# Patient Record
Sex: MALE | Race: WHITE
[De-identification: names, ages, dates, MRNs, and addresses within clinical notes are randomized per-mention and may not be internally consistent; named-entity substitution may affect disease eponyms.]

---

## 2021-04-25 ENCOUNTER — HOSPITAL ENCOUNTER (EMERGENCY)
Dept: HOSPITAL 8 - ED | Age: 31
Discharge: HOME | End: 2021-04-25
Payer: MEDICAID

## 2021-04-25 VITALS — BODY MASS INDEX: 37.92 KG/M2 | HEIGHT: 72 IN | WEIGHT: 279.99 LBS

## 2021-04-25 VITALS — SYSTOLIC BLOOD PRESSURE: 127 MMHG | DIASTOLIC BLOOD PRESSURE: 70 MMHG

## 2021-04-25 DIAGNOSIS — Z91.14: ICD-10-CM

## 2021-04-25 DIAGNOSIS — F43.12: Primary | ICD-10-CM

## 2021-04-25 DIAGNOSIS — Z76.0: ICD-10-CM

## 2021-04-25 DIAGNOSIS — F17.210: ICD-10-CM

## 2021-04-25 PROCEDURE — 99281 EMR DPT VST MAYX REQ PHY/QHP: CPT

## 2021-04-25 PROCEDURE — 99406 BEHAV CHNG SMOKING 3-10 MIN: CPT

## 2021-04-25 NOTE — NUR
assumed care of pt.  pt here stating that he thinks that he needs his meds 
adjusted.  pt currently has no MD and states that the last time that he got his 
meds [place].  



pt is restless and pacing around the room.  denies injury.  denies 
hallucinations/delusions.  A&O x4.  no injuries noted



no family at bedside

## 2021-04-25 NOTE — NUR
TASK RN: PT BIB SELF VIA POV. PER PT "MEDS MALFUNCTIONING, TRYING TO GET THEM 
FIXED. I HAVE PTSD.  I'M HAVING A REACTION TO THE DEPAKOTE. I HAVENT BEEN ABLE 
TO SLEEP IN A COUPLE OF DAYS". PT DENIES SI AT THIS TIME. PT RESTING IN [facility], 
MONITORING IN PLACE, NADN AT THIS TIME, PER PT NO NEEDS AT THIS TIME.